# Patient Record
Sex: MALE | Race: WHITE | Employment: UNEMPLOYED | URBAN - NONMETROPOLITAN AREA
[De-identification: names, ages, dates, MRNs, and addresses within clinical notes are randomized per-mention and may not be internally consistent; named-entity substitution may affect disease eponyms.]

---
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
## 2024-01-09 ENCOUNTER — TELEPHONE (OUTPATIENT)
Dept: PEDIATRICS | Age: 19
End: 2024-01-09

## 2024-01-09 NOTE — TELEPHONE ENCOUNTER
I spoke with Shiraz from [facility] regarding prior auth for [id number].  No prior auth is needed.  Call reference # [id number].

## 2024-01-18 ENCOUNTER — HOSPITAL ENCOUNTER (OUTPATIENT)
Dept: PEDIATRICS | Age: 19
Discharge: HOME | End: 2024-01-18
Payer: COMMERCIAL

## 2024-01-18 ENCOUNTER — HOSPITAL ENCOUNTER (OUTPATIENT)
Age: 19
Discharge: HOME | End: 2024-01-20
Attending: INTERNAL MEDICINE
Payer: COMMERCIAL

## 2024-01-18 VITALS
OXYGEN SATURATION: 100 % | SYSTOLIC BLOOD PRESSURE: 130 MMHG | TEMPERATURE: 97 F | WEIGHT: 144.7 LBS | DIASTOLIC BLOOD PRESSURE: 65 MMHG | RESPIRATION RATE: 16 BRPM | HEIGHT: 68 IN | BODY MASS INDEX: 21.93 KG/M2 | HEART RATE: 74 BPM

## 2024-01-18 DIAGNOSIS — Z95.2 S/P MITRAL VALVE REPLACEMENT: ICD-10-CM

## 2024-01-18 DIAGNOSIS — Z87.74 S/P ATRIOVENTRICULAR SEPTAL DEFECT REPAIR: ICD-10-CM

## 2024-01-18 DIAGNOSIS — Z95.2 S/P MITRAL VALVE REPLACEMENT: Primary | ICD-10-CM

## 2024-01-18 LAB
ECHO AO ASC DIAM: 2.4 CM
ECHO AO ASCENDING AORTA INDEX: 1.36 CM/M2
ECHO AO SINUS VALSALVA DIAM: 3.1 CM
ECHO AO SINUS VALSALVA INDEX: 1.75 CM/M2
ECHO AO ST JNCT DIAM: 2.5 CM
ECHO AV CUSP MM: 2.3 CM
ECHO AV MEAN GRADIENT: 8 MMHG
ECHO AV MEAN VELOCITY: 1.2 M/S
ECHO AV PEAK GRADIENT: 16 MMHG
ECHO AV PEAK VELOCITY: 2 M/S
ECHO AV VELOCITY RATIO: 0.65
ECHO AV VTI: 37.8 CM
ECHO BSA: 1.77 M2
ECHO LA DIAMETER INDEX: 1.81 CM/M2
ECHO LA DIAMETER: 3.2 CM
ECHO LV E' LATERAL VELOCITY: 13 CM/S
ECHO LV E' SEPTAL VELOCITY: 12 CM/S
ECHO LV FRACTIONAL SHORTENING: 31 % (ref 28–44)
ECHO LV INTERNAL DIMENSION DIASTOLE INDEX: 2.71 CM/M2
ECHO LV INTERNAL DIMENSION DIASTOLIC: 4.8 CM (ref 4.2–5.9)
ECHO LV INTERNAL DIMENSION SYSTOLIC INDEX: 1.86 CM/M2
ECHO LV INTERNAL DIMENSION SYSTOLIC: 3.3 CM
ECHO LV IVSD: 0.9 CM (ref 0.6–1)
ECHO LV MASS 2D: 147.8 G (ref 88–224)
ECHO LV MASS INDEX 2D: 83.5 G/M2 (ref 49–115)
ECHO LV POSTERIOR WALL DIASTOLIC: 0.9 CM (ref 0.6–1)
ECHO LV RELATIVE WALL THICKNESS RATIO: 0.38
ECHO LVOT AV VTI INDEX: 0.6
ECHO LVOT MEAN GRADIENT: 3 MMHG
ECHO LVOT PEAK GRADIENT: 6 MMHG
ECHO LVOT PEAK VELOCITY: 1.3 M/S
ECHO LVOT VTI: 22.5 CM
ECHO MV A VELOCITY: 0.88 M/S
ECHO MV E DECELERATION TIME (DT): 271 MS
ECHO MV E VELOCITY: 1.68 M/S
ECHO MV E/A RATIO: 1.91
ECHO MV E/E' LATERAL: 12.92
ECHO MV E/E' RATIO (AVERAGED): 13.46
ECHO MV LVOT VTI INDEX: 2.19
ECHO MV MAX VELOCITY: 2 M/S
ECHO MV MEAN GRADIENT: 6 MMHG
ECHO MV MEAN VELOCITY: 1.2 M/S
ECHO MV PEAK GRADIENT: 16 MMHG
ECHO MV VTI: 49.3 CM
ECHO PULMONARY ARTERY END DIASTOLIC PRESSURE: 5 MMHG
ECHO PV MAX VELOCITY: 0.8 M/S
ECHO PV PEAK GRADIENT: 3 MMHG
ECHO PV REGURGITANT MAX VELOCITY: 1.1 M/S
ECHO RV FREE WALL PEAK S': 11 CM/S
ECHO RV INTERNAL DIMENSION: 1.9 CM
ECHO RV TAPSE: 1.6 CM (ref 1.7–?)
ECHO TV E WAVE: 0.7 M/S
ECHO TV REGURGITANT MAX VELOCITY: 2.33 M/S
ECHO TV REGURGITANT PEAK GRADIENT: 22 MMHG

## 2024-01-18 PROCEDURE — 93005 ELECTROCARDIOGRAM TRACING: CPT | Performed by: INTERNAL MEDICINE

## 2024-01-18 PROCEDURE — 99212 OFFICE O/P EST SF 10 MIN: CPT

## 2024-01-18 PROCEDURE — 93325 DOPPLER ECHO COLOR FLOW MAPG: CPT

## 2024-01-18 RX ORDER — WARFARIN SODIUM 6 MG/1
6 TABLET ORAL DAILY
COMMUNITY
Start: 2023-11-22

## 2024-01-18 NOTE — PROGRESS NOTES
[facility] Adult Congenital Heart () Program Clinic Note     History of Present Illness  We had the pleasure of seeing Rao Guerrero in the [facility] Adult [facility] at [facility] today. Rao is a 18 y.o. old male with a history of atrioventricular septal defect status post repair and status post mechanical left AV valve replacement.  We last evaluated him in 1/2023, he presents today for routine cardiology follow up.    Interval Hx:  Since his last visit, he has been doing great. He denies any ED visits, hospitalizations or significant illnesses.  Ge does not have any new cardiovascular concerns or symptoms.  He denies symptoms of chest pain, palpitations, shortness of breath at rest, dyspnea on exertion, PND, orthopnea, syncope, pre-syncope, lightheadedness, dizziness, or peripheral edema.     Past Medical and Surgical History:  Atrial ventricular septal defect status post repair on June 14, 2005.  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  Status post mechanical valve replacement with 27 mm [place] valve on January 7, 2014.    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    S/P atrioventricular septal defect repair 2005 3/25/2014    Strep throat       Past Surgical History:   Procedure Laterality Date    ADENOIDECTOMY      2010    CARDIAC SURGERY  6/2005    asd & vsd    CARDIAC SURGERY  1/14/2013    mitral valve repair    CARDIAC SURGERY  1/7/2014    mitral valve replacement-synthetic    HYDROCELE EXCISION  2005    hydrocelectomy    TONSILLECTOMY  2010      Family History   Problem Relation Age of Onset    High Blood Pressure Mother     No Known Problems Father     No Known Problems Brother     No Known Problems Maternal Grandmother     Cancer Maternal Grandfather     High

## 2024-01-18 NOTE — DISCHARGE INSTRUCTIONS
Contact information: If you need to reach Dr. Narvaez for questions or concerns please call our office at [phone] (M-F 8am-5pm). After hours or on the weekends call [phone] and ask for the Adult Cardiologist on call.   Continue care with your PCP  You will have an EKG/ECHO @ return visit  Medications: Continue as you are  Yes you need an antibiotic  prior to any dental work  Return visit is scheduled in 1 year

## 2024-01-21 LAB
EKG ATRIAL RATE: 65 BPM
EKG P-R INTERVAL: 258 MS
EKG Q-T INTERVAL: 388 MS
EKG QRS DURATION: 100 MS
EKG QTC CALCULATION (BAZETT): 403 MS
EKG R AXIS: -68 DEGREES
EKG T AXIS: 17 DEGREES
EKG VENTRICULAR RATE: 65 BPM

## 2025-01-16 ENCOUNTER — HOSPITAL ENCOUNTER (OUTPATIENT)
Age: 20
Discharge: HOME | End: 2025-01-18
Attending: INTERNAL MEDICINE
Payer: COMMERCIAL

## 2025-01-16 ENCOUNTER — HOSPITAL ENCOUNTER (OUTPATIENT)
Dept: PEDIATRICS | Age: 20
Discharge: HOME | End: 2025-01-16
Payer: COMMERCIAL

## 2025-01-16 DIAGNOSIS — Z98.890 S/P MITRAL VALVE REPAIR: ICD-10-CM

## 2025-01-16 DIAGNOSIS — Z87.74 S/P ATRIOVENTRICULAR SEPTAL DEFECT REPAIR: Primary | ICD-10-CM

## 2025-01-16 DIAGNOSIS — Z87.74 S/P ATRIOVENTRICULAR SEPTAL DEFECT REPAIR: ICD-10-CM

## 2025-01-16 LAB
ECHO AO ASC DIAM: 2.3 CM
ECHO AO SINUS VALSALVA DIAM: 3.2 CM
ECHO AO ST JNCT DIAM: 2.4 CM
ECHO AV CUSP MM: 1.8 CM
ECHO AV MEAN GRADIENT: 10 MMHG
ECHO AV MEAN VELOCITY: 1.5 M/S
ECHO AV PEAK GRADIENT: 19 MMHG
ECHO AV PEAK VELOCITY: 2.2 M/S
ECHO AV VELOCITY RATIO: 0.64
ECHO AV VTI: 40.5 CM
ECHO EST RA PRESSURE: 3 MMHG
ECHO LA DIAMETER: 3.5 CM
ECHO LV FRACTIONAL SHORTENING: 33 % (ref 28–44)
ECHO LV INTERNAL DIMENSION DIASTOLIC: 5.1 CM (ref 4.2–5.9)
ECHO LV INTERNAL DIMENSION SYSTOLIC: 3.4 CM
ECHO LV ISOVOLUMETRIC RELAXATION TIME (IVRT): 58 MS
ECHO LV IVSD: 0.8 CM (ref 0.6–1)
ECHO LV MASS 2D: 140.5 G (ref 88–224)
ECHO LV POSTERIOR WALL DIASTOLIC: 0.8 CM (ref 0.6–1)
ECHO LV RELATIVE WALL THICKNESS RATIO: 0.31
ECHO LVOT AV VTI INDEX: 0.54
ECHO LVOT MEAN GRADIENT: 4 MMHG
ECHO LVOT PEAK GRADIENT: 8 MMHG
ECHO LVOT PEAK VELOCITY: 1.4 M/S
ECHO LVOT VTI: 22 CM
ECHO MV A VELOCITY: 1.46 M/S
ECHO MV E DECELERATION TIME (DT): 191 MS
ECHO MV E VELOCITY: 1.62 M/S
ECHO MV E/A RATIO: 1.11
ECHO MV LVOT VTI INDEX: 2.01
ECHO MV MAX VELOCITY: 1.7 M/S
ECHO MV MEAN GRADIENT: 6 MMHG
ECHO MV MEAN VELOCITY: 1.2 M/S
ECHO MV PEAK GRADIENT: 12 MMHG
ECHO MV VTI: 44.2 CM
ECHO PV MAX VELOCITY: 0.8 M/S
ECHO PV PEAK GRADIENT: 3 MMHG
ECHO RIGHT VENTRICULAR SYSTOLIC PRESSURE (RVSP): 20 MMHG
ECHO RV INTERNAL DIMENSION: 1.6 CM
ECHO RV TAPSE: 1.8 CM (ref 1.7–?)
ECHO TV E WAVE: 0.5 M/S
ECHO TV REGURGITANT MAX VELOCITY: 2.08 M/S
ECHO TV REGURGITANT PEAK GRADIENT: 17 MMHG
EKG ATRIAL RATE: 77 BPM
EKG P AXIS: 21 DEGREES
EKG P-R INTERVAL: 266 MS
EKG Q-T INTERVAL: 380 MS
EKG QRS DURATION: 106 MS
EKG QTC CALCULATION (BAZETT): 430 MS
EKG R AXIS: -60 DEGREES
EKG T AXIS: 9 DEGREES
EKG VENTRICULAR RATE: 77 BPM

## 2025-01-16 PROCEDURE — 93005 ELECTROCARDIOGRAM TRACING: CPT | Performed by: INTERNAL MEDICINE

## 2025-01-16 PROCEDURE — 93325 DOPPLER ECHO COLOR FLOW MAPG: CPT

## 2025-01-16 PROCEDURE — 99212 OFFICE O/P EST SF 10 MIN: CPT

## 2025-01-16 RX ORDER — CHLORAMPHENICOL PALMITATE
POWDER (GRAM) MISCELLANEOUS
COMMUNITY

## 2025-01-16 NOTE — PROGRESS NOTES
[facility] Adult Congenital Heart () Program Clinic Note    History of Present Illness    We had the pleasure of seeing Rao Guerrero in the [facility] Adult Congenital Heart Disease outreach clinic at [facility] today.     Rao is a 19 y.o. old male with a history of atrioventricular canal defect status post repair and most recently s/p mechanical LAAV replacement in 2014. He is on warfarin and follows with [facility] hematology.     Summary of Cardiac Diagnoses and Procedures  AVCD s/p repair (primary VSD closure, patch ASD closure, LAVV cleft repair) on 6/14/05 with Dr. Finley at [facility].   Residual LAVVR s/p LAVV repair with Cedrick stitch in 1/2013 with Dr. Curran at [facility].   Residual LAVVR s/p mechanical valve replacement with 27 mm Hoffman valve on 1/7/2014 with Dr. Curran at [facility].     We last evaluated him in 1/18/2024 at which time he was doing well with stable findings on echo. He presents today for routine cardiology follow up.    Interval History/Subjective  Informant: patient  Since his last visit, he has been doing well.   He continues to exercise regularly and shares that he really enjoys fitness. He denies symptoms when exerting himself.   Continues to follow with hematology for management of Warfarin. He shares that his home test strips for checking INR at home have been giving him an error read.   He also shares that he is taking a couple of supplements, including magnesium, creatine, and ashwagandha.   Denies any ED visits, hospitalizations or significant illnesses.    Does not report any new cardiovascular concerns or symptoms.   Patient otherwise denies symptoms of chest pain, palpitations, shortness of breath at rest, dyspnea on exertion, PND, orthopnea, syncope, pre-syncope, lightheadedness, dizziness, or peripheral edema.    Review of prior records was also performed for additional pertinent history.    Past Medical History:   Diagnosis

## 2025-01-16 NOTE — DISCHARGE INSTRUCTIONS
Contact information: If you need to reach Dr. Narvaez for questions or concerns please call our office at [phone] (M-F 8am-5pm). After hours or on the weekends call [phone] and ask for the Adult Cardiologist on call.   Continue care with your PCP  You will have an EKG/ECHO @ return visit  Medications: Continue as you are  An antibiotic is needed prior to any dental work  Return visit is scheduled in 1 year

## 2025-01-16 NOTE — PROGRESS NOTES
I saw and examined the patient and formulated a plan for evaluation and management with Crystal Kothari CNP. I have reviewed the ECG and echocardiogram and I agree with the note above.     20 yo with history of AVSD s/p repair (6/2005, [facility]), residual left AV valve insufficiency status post left AV valve repair with Cedrick stitch in (1/2013, [facility]), s/p LAVV replacement mechanical valve replacement with 27 mm [place] valve (1/7/2014, [facility]) on coumadin with INR goal 2.5-3.5 (managed by [facility] clinic).      He feels well. On CV exam he has usual prosthetic valve heart sounds. He is euvolemic. ECG and echo were personally reviewed and echo showed a well positioned prosthetic left AV valve with no significant stenosis or regurgitation.      Reinforced SBE prophylaxis and adherence to warfarin.     Louis Narvaez MD   Internal Medicine   [facility]

## 2025-01-17 VITALS
RESPIRATION RATE: 16 BRPM | SYSTOLIC BLOOD PRESSURE: 115 MMHG | OXYGEN SATURATION: 98 % | DIASTOLIC BLOOD PRESSURE: 65 MMHG | HEART RATE: 76 BPM